# Patient Record
Sex: MALE | Race: WHITE
[De-identification: names, ages, dates, MRNs, and addresses within clinical notes are randomized per-mention and may not be internally consistent; named-entity substitution may affect disease eponyms.]

---

## 2022-06-27 ENCOUNTER — HOSPITAL ENCOUNTER (EMERGENCY)
Dept: HOSPITAL 75 - ER FS | Age: 57
Discharge: HOME | End: 2022-06-27
Payer: SELF-PAY

## 2022-06-27 VITALS — HEIGHT: 66.93 IN | BODY MASS INDEX: 35.99 KG/M2 | WEIGHT: 229.28 LBS

## 2022-06-27 VITALS — SYSTOLIC BLOOD PRESSURE: 200 MMHG | DIASTOLIC BLOOD PRESSURE: 130 MMHG

## 2022-06-27 DIAGNOSIS — N50.89: ICD-10-CM

## 2022-06-27 DIAGNOSIS — F17.210: ICD-10-CM

## 2022-06-27 DIAGNOSIS — N50.812: Primary | ICD-10-CM

## 2022-06-27 LAB
APTT PPP: (no result) S
BACTERIA #/AREA URNS HPF: NEGATIVE /HPF
BILIRUB UR QL STRIP: NEGATIVE
FIBRINOGEN PPP-MCNC: (no result) MG/DL
GLUCOSE UR STRIP-MCNC: NEGATIVE MG/DL
HYALINE CASTS #/AREA URNS LPF: (no result) /LPF
KETONES UR QL STRIP: NEGATIVE
LEUKOCYTE ESTERASE UR QL STRIP: NEGATIVE
NITRITE UR QL STRIP: NEGATIVE
PH UR STRIP: 6 [PH] (ref 5–9)
PROT UR QL STRIP: (no result)
RBC #/AREA URNS HPF: (no result) /HPF
SP GR UR STRIP: >=1.03 (ref 1.02–1.02)
SQUAMOUS #/AREA URNS HPF: (no result) /HPF
WBC #/AREA URNS HPF: (no result) /HPF

## 2022-06-27 PROCEDURE — 81000 URINALYSIS NONAUTO W/SCOPE: CPT

## 2022-06-27 PROCEDURE — 76870 US EXAM SCROTUM: CPT

## 2022-06-27 NOTE — ED GU-MALE
General


Chief Complaint:   - Reproductive


Stated Complaint:  SWOLLEN TESTICLE


Nursing Triage Note:  


Patient has presented to ER with cc of left testicle pain and swelling that 


started yesterday.  Patient states that 6 years ago he had an accicent at work 


and since that time he has has 2 other episodes of testicle swelling.  He has 


been to the ER 2 times before.  He reports that yesterday the swelling began - 


he did take ibuprofen last night for the pain but no pain medication today,  He 


states the pain and swelling is worse and came to ER for evaluation.  Patient 


reports that in the past a pain pill and antiboitic has taken care of the 


problem.





History of Present Illness


Date Seen by Provider:  2022


Time Seen by Provider:  09:40


Initial Comments


56-year-old male complains of left testicular swelling since yesterday.  Patient

states he has had right testicular swelling in the past.  Patient had an 

accident at work about 6 years ago and he has had some swelling since then.  

Patient was seen in outside ERs both times.  States they gave him a shot and it 

went away.  Patient did have a little bit of pain with urination this morning 

but not yesterday.  Patient denies any trauma to it recently.  He was at work 

carrying things and going up stairs at work but nothing superheavy.  No pain 

with bowel movement.  Pain on palpation.  Patient states when he touches it it 

causes a lot of pain.  He has not seen a urologist for this.  Patient does not 

have a primary care doctor.


Timing/Duration:  yesterday


Severity/Quality:  aching


Location:  groin, scrotal


Activities at Onset:  none





Allergies and Home Medications


Allergies


Coded Allergies:  


     No Known Allergies (Verified  Allergy, Unknown, 22)





Patient Home Medication List


Home Medication List Reviewed:  Yes





Review of Systems


Review of Systems


Constitutional:  see HPI





Past Medical-Social-Family Hx


Patient Social History


Tobacco Use?:  Yes


Tobacco type used:  Cigarettes


Substance use?:  No


Alcohol Use?:  Yes


Alcohol Frequency:  Rarely





Physical Exam


Vital Signs





Vital Signs - First Documented








 22





 09:09


 


Temp 36.9


 


Pulse 100


 


Resp 16


 


B/P (MAP) 201/137 (158)


 


Pulse Ox 94


 


O2 Delivery Room Air





Capillary Refill :


Height, Weight, BMI


Height: '"


Weight: lbs. oz. kg; 35.00 BMI


Method:


General Appearance:  WD/WN, mild distress


Neck:  supple


Genital/Rectal:  tenderness (with palpation of the left testicle. noted some 

abnormal lumps on left testicle. )





Progress/Results/Core Measures


Suspected Sepsis


SIRS


Temperature: 


Pulse: 100 


Respiratory Rate: 16


 


Blood Pressure 201 /137 


Mean: 158





Results/Orders


Lab Results





Laboratory Tests








Test


 22


10:23 Range/Units


 


 


Urine Color DK YELLOW   


 


Urine Clarity SL CLOUDY   


 


Urine pH 6.0  5-9  


 


Urine Specific Gravity >=1.030  1.016-1.022  


 


Urine Protein 2+ H NEGATIVE  


 


Urine Glucose (UA) NEGATIVE  NEGATIVE  


 


Urine Ketones NEGATIVE  NEGATIVE  


 


Urine Nitrite NEGATIVE  NEGATIVE  


 


Urine Bilirubin NEGATIVE  NEGATIVE  


 


Urine Urobilinogen 0.2  < = 1.0  MG/DL


 


Urine Leukocyte Esterase NEGATIVE  NEGATIVE  


 


Urine RBC (Auto) TRACE-I H NEGATIVE  


 


Urine RBC 0-2   /HPF


 


Urine WBC 0-2   /HPF


 


Urine Squamous Epithelial


Cells RARE 


  /HPF





 


Urine Crystals NONE   /LPF


 


Urine Bacteria NEGATIVE   /HPF


 


Urine Casts PRESENT   /LPF


 


Urine Hyaline Casts 2-5 H  /LPF


 


Urine Mucus SMALL H  /LPF


 


Urine Culture Indicated NO   








My Orders





Orders - LUZ MARIA THOMPSON MD


Ua Culture If Indicated (22 09:55)


Us Scrotum (Testicle) [id number] (22 09:57)


Ketorolac Injection (Toradol Injection) (22 10:00)





Vital Signs/I&O











 22





 09:09 11:27


 


Temp 36.9 


 


Pulse 100 100


 


Resp 16 16


 


B/P (MAP) 201/137 (158) 200/130


 


Pulse Ox 94 95


 


O2 Delivery Room Air Room Air





Capillary Refill :








Blood Pressure Mean:                    158








Progress Note :  


   Time:  11:09


Progress Note


Urinalysis and scrotal ultrasound reviewed.  Nothing emergent.  Noted some 

cysts.  Testicles similar in size.  No abnormal lumps or bumps.  Urinalysis did 

not show any infection.  Patient is having a little protein in the urine.  

Recommend he follow-up with a primary care for that.  Recommend he get a primary

care.  Toradol shot did help with his pain.  Recommend ibuprofen Tylenol rest 

and ice.  Follow-up with primary care return to ER if symptoms worsen





Diagnostic Imaging





   Diagonstic Imaging:  Ultrasound


   Plain Films/CT/US/NM/MRI:  other


Comments


                 [facility]


                                [place], [place]





NAME:   MG LALA


[facility] REC#:   [id number]


ACCOUNT#:   [id number]


PT STATUS:   DEP ER


:   1965


PHYSICIAN:   LUZ MARIA THOMPSON MD


ADMIT DATE:   22/ER FS


                                  ***Signed***


Date of Exam:22





US SCROTUM (TESTICLE) [id number]








PROCEDURE: US Scrotum.





TECHNIQUE: Multiple real-time grayscale images were obtained over


the scrotum in various projections bilaterally.





INDICATION: Left-sided testicular pain.





COMPARISON: None





FINDINGS: Testicles are symmetric in size, shape, and


echogenicity. Right testicle measures 4.5 x 2.5 x 1.8 cm and the


left measures 4.8 x 3 x 2.2 cm. No suspicious parenchymal 


intratesticular masses are seen. There is an anechoic benign


intratesticular cyst on the left that measures 3 x 3 x 2 mm.


Color flow images show normal symmetric vascularity.





Extratesticular anechoic benign-appearing cyst is also present on


the left and measures 9 x 7 x 7 mm. Epididymis are otherwise


normal in size and echogenicity. There is no large hydrocele. No


large varicocele is identified on either side.





IMPRESSION:


1. No acute intra-scrotal abnormality is seen.


2. Other nonemergent findings as detailed above.





Dictated by: 





  Dictated on workstation # [id number]








Dict:   22 1033


Trans:   22 0809


CV [id number]





Interpreted by:     FRANKLIN GUTIERREZ MD


Electronically signed by: FRANKLIN GUTIERREZ MD 22 0809





Departure


Impression





   Primary Impression:  


   Testicular pain, left


Disposition:  01 HOME, SELF-CARE


Condition:  Stable





Departure-Patient Inst.


Decision time for Depature:  11:11


Patient Instructions:  Acute Pain, Adult





Add. Discharge Instructions:  


Recommend Ibuprofen and acetaminophen for pain. no more than 3000mg 

acetaminophen in a 24 hour period. Recommend Primary Care establishment for 

further follow up. 





All discharge instructions reviewed with patient and/or family. Voiced 

understanding.











LUZ MARIA THOMPSON MD         2022 09:45

## 2022-06-27 NOTE — DIAGNOSTIC IMAGING REPORT
PROCEDURE: US Scrotum.



TECHNIQUE: Multiple real-time grayscale images were obtained over

the scrotum in various projections bilaterally.



INDICATION: Left-sided testicular pain.



COMPARISON: None



FINDINGS: Testicles are symmetric in size, shape, and

echogenicity. Right testicle measures 4.5 x 2.5 x 1.8 cm and the

left measures 4.8 x 3 x 2.2 cm. No suspicious parenchymal 

intratesticular masses are seen. There is an anechoic benign

intratesticular cyst on the left that measures 3 x 3 x 2 mm.

Color flow images show normal symmetric vascularity.



Extratesticular anechoic benign-appearing cyst is also present on

the left and measures 9 x 7 x 7 mm. Epididymis are otherwise

normal in size and echogenicity. There is no large hydrocele. No

large varicocele is identified on either side.



IMPRESSION:

1. No acute intra-scrotal abnormality is seen.

2. Other nonemergent findings as detailed above.



Dictated by: 



  Dictated on workstation # [id number]